# Patient Record
Sex: FEMALE | Race: WHITE | Employment: OTHER | URBAN - METROPOLITAN AREA
[De-identification: names, ages, dates, MRNs, and addresses within clinical notes are randomized per-mention and may not be internally consistent; named-entity substitution may affect disease eponyms.]

---

## 2017-05-29 ENCOUNTER — OFFICE VISIT (OUTPATIENT)
Dept: URGENT CARE | Facility: URGENT CARE | Age: 67
End: 2017-05-29
Payer: COMMERCIAL

## 2017-05-29 ENCOUNTER — RADIANT APPOINTMENT (OUTPATIENT)
Dept: GENERAL RADIOLOGY | Facility: CLINIC | Age: 67
End: 2017-05-29
Attending: PHYSICIAN ASSISTANT
Payer: COMMERCIAL

## 2017-05-29 VITALS
WEIGHT: 145.6 LBS | OXYGEN SATURATION: 96 % | TEMPERATURE: 97.9 F | HEART RATE: 70 BPM | DIASTOLIC BLOOD PRESSURE: 70 MMHG | BODY MASS INDEX: 24.42 KG/M2 | SYSTOLIC BLOOD PRESSURE: 108 MMHG

## 2017-05-29 DIAGNOSIS — S69.92XA: Primary | ICD-10-CM

## 2017-05-29 DIAGNOSIS — S69.92XA: ICD-10-CM

## 2017-05-29 PROCEDURE — 73110 X-RAY EXAM OF WRIST: CPT | Mod: LT

## 2017-05-29 PROCEDURE — 99213 OFFICE O/P EST LOW 20 MIN: CPT | Performed by: PHYSICIAN ASSISTANT

## 2017-05-29 NOTE — PROGRESS NOTES
SUBJECTIVE:  Chief Complaint   Patient presents with     Urgent Care     Fall     Pt states fell off porch yesterday and hurt left wrist. States has taken ibuprofen for the pain.      Krystal Douglass is a 67 year old female who presents with a chief complaint of left wrist swelling, tenderness and decreased range of motion.  Symptoms began 1 day(s) ago, are mild and sudden onset  Context:  Injury:Yes: yesterday.  Injury happened while at home. How: taking down the flag from the porch and fell landing on left wrist and side.  No other injuries delayed pain, delayed swelling, was able to bear weight directly after injury, no deformity was noted by the patient.   Pain exacerbated by twisting and flexion/extension Relieved by rest.  She treated it initially with ice and Ibuprofen. This is the first time this type of injury has occurred to this patient.     Past Medical History:   Diagnosis Date     Disorder of bone and cartilage, unspecified      Osteopenia 11/16/2005     Other specified personal history presenting hazards to health(V15.89)     abn paps with nl colpo     Unspecified disorder of lipoid metabolism      Current Outpatient Prescriptions   Medication Sig Dispense Refill     fluticasone (FLONASE) 50 MCG/ACT nasal spray Spray 2 sprays into both nostrils daily 3 Package 3     OMEGA 3 1000 MG OR CAPS one daliy  0     PROGESTERONE (YAM ROOT) CREAM bioidentical hormone cream  0     Social History   Substance Use Topics     Smoking status: Former Smoker     Packs/day: 0.30     Years: 30.00     Types: Cigarettes     Quit date: 7/1/2004     Smokeless tobacco: Never Used      Comment: 1 year and 3 months     Alcohol use Yes      Comment: 0-2 per week       ROS:  Review of systems negative except as stated below    EXAM:   /70 (BP Location: Right arm, Patient Position: Chair, Cuff Size: Adult Regular)  Pulse 70  Temp 97.9  F (36.6  C) (Tympanic)  Wt 145 lb 9.6 oz (66 kg)  LMP 02/22/2004  SpO2 96%  BMI 24.42  kg/m2  M/S Exam:wrist left with no swelling, bruising or deformity noted.  FROM in flexion and extension.  Focal pain over ulnar styloid.  Mild pain with supination.  Hand non tender.  Tendon function intact.   GENERAL APPEARANCE: healthy, alert and no distress  EXTREMITIES: peripheral pulses normal  SKIN: no suspicious lesions or rashes  NEURO: Normal strength and tone, sensory exam grossly normal, mentation intact and speech normal    X-RAY was done.  Normal     assessment/plan:  (S69.92XA) Injury of wrist, left, initial encounter  (primary encounter diagnosis)  Comment: left   Plan: XR Wrist Left G/E 3 Views, order for DME        No fracture noted.  Patient right handed but would like a splint for support.  Ice and OTC med for pain.  FU with PCP as needed.  ROM encouraged.

## 2017-05-29 NOTE — MR AVS SNAPSHOT
After Visit Summary   5/29/2017    Krystal Douglass    MRN: [id number]           Patient Information     Date Of Birth          1950        Visit Information        Provider Department      5/29/2017 9:20 AM Caitlyn Ball PA-C [facility]        Today's Diagnoses     Injury of wrist, left, initial encounter    -  1       Follow-ups after your visit        Who to contact     If you have questions or need follow up information about today's clinic visit or your schedule please contact [facility] directly at [phone].  Normal or non-critical lab and imaging results will be communicated to you by ClassPasshart, letter or phone within 4 business days after the clinic has received the results. If you do not hear from us within 7 days, please contact the clinic through Atmosferiqt or phone. If you have a critical or abnormal lab result, we will notify you by phone as soon as possible.  Submit refill requests through CS-Keys or call your pharmacy and they will forward the refill request to us. Please allow 3 business days for your refill to be completed.          Additional Information About Your Visit        MyChart Information     CS-Keys gives you secure access to your electronic health record. If you see a primary care provider, you can also send messages to your care team and make appointments. If you have questions, please call your primary care clinic.  If you do not have a primary care provider, please call [phone] and they will assist you.        Care EveryWhere ID     This is your Care EveryWhere ID. This could be used by other organizations to access your [place] medical records  [id number]        Your Vitals Were     Pulse Temperature Last Period Pulse Oximetry BMI (Body Mass Index)       70 97.9  F (36.6  C) (Tympanic) 02/22/2004 96% 24.42 kg/m2        Blood Pressure from Last 3 Encounters:   05/29/17 108/70   09/15/14 100/62   03/18/13 102/58    Weight  from Last 3 Encounters:   05/29/17 145 lb 9.6 oz (66 kg)   09/15/14 138 lb 12.8 oz (63 kg)   03/18/13 143 lb 11.2 oz (65.2 kg)                 Today's Medication Changes          These changes are accurate as of: 5/29/17 12:05 PM.  If you have any questions, ask your nurse or doctor.               Start taking these medicines.        Dose/Directions    order for DME   Used for:  Injury of wrist, left, initial encounter   Started by:  Caitlyn Ball PA-C        Left wrist brace without thumb   Quantity:  1 Device   Refills:  0            Where to get your medicines      Some of these will need a paper prescription and others can be bought over the counter.  Ask your nurse if you have questions.     Bring a paper prescription for each of these medications     order for DME                Primary Care Provider Office Phone # Fax #    Sharona Espinoza [phone]       [facility] [address] [place] [id number]        Thank you!     Thank you for choosing [facility]  for your care. Our goal is always to provide you with excellent care. Hearing back from our patients is one way we can continue to improve our services. Please take a few minutes to complete the written survey that you may receive in the mail after your visit with us. Thank you!             Your Updated Medication List - Protect others around you: Learn how to safely use, store and throw away your medicines at www.disposemymeds.org.          This list is accurate as of: 5/29/17 12:05 PM.  Always use your most recent med list.                   Brand Name Dispense Instructions for use    fluticasone 50 MCG/ACT spray    FLONASE    3 Package    Spray 2 sprays into both nostrils daily       omega 3 1000 MG Caps      one daliy       order for DME     1 Device    Left wrist brace without thumb       PROGESTERONE (YAM ROOT) CREAM      bioidentical hormone cream

## 2017-05-29 NOTE — NURSING NOTE
"Chief Complaint   Patient presents with     Urgent Care     Fall     Pt states fell off porch yesterday and hurt left wrist. States has taken ibuprofen for the pain.        Initial /70 (BP Location: Right arm, Patient Position: Chair, Cuff Size: Adult Regular)  Pulse 70  Temp 97.9  F (36.6  C) (Tympanic)  Wt 145 lb 9.6 oz (66 kg)  LMP 02/22/2004  SpO2 96%  BMI 24.42 kg/m2 Estimated body mass index is 24.42 kg/(m^2) as calculated from the following:    Height as of 9/15/14: 5' 4.75\" (1.645 m).    Weight as of this encounter: 145 lb 9.6 oz (66 kg).  Medication Reconciliation: unable or not appropriate to perform   Gabriela Ahumada CMA (AAMA) 5/29/2017 9:32 AM    "

## 2017-06-12 ENCOUNTER — TELEPHONE (OUTPATIENT)
Dept: PEDIATRICS | Facility: CLINIC | Age: 67
End: 2017-06-12

## 2017-06-12 ENCOUNTER — MYC MEDICAL ADVICE (OUTPATIENT)
Dept: URGENT CARE | Facility: URGENT CARE | Age: 67
End: 2017-06-12

## 2017-06-12 NOTE — TELEPHONE ENCOUNTER
Notified patient via mychart that pt does not need referral to ortho per Caitlyn Ball.   Gabriela Ahumada CMA (AAMA) 6/12/2017 2:42 PM

## 2017-06-12 NOTE — TELEPHONE ENCOUNTER
Patient was seen over the weekend of 5/29  and her wrist is not much better at all. Wondering if she could get a referral to Ortho? [phone] ok to lm.   Jennifer Ferguson RN

## 2017-06-24 ENCOUNTER — HEALTH MAINTENANCE LETTER (OUTPATIENT)
Age: 67
End: 2017-06-24

## 2018-01-24 ENCOUNTER — OFFICE VISIT (OUTPATIENT)
Dept: URGENT CARE | Facility: URGENT CARE | Age: 68
End: 2018-01-24
Payer: COMMERCIAL

## 2018-01-24 ENCOUNTER — NURSE TRIAGE (OUTPATIENT)
Dept: NURSING | Facility: CLINIC | Age: 68
End: 2018-01-24

## 2018-01-24 VITALS
BODY MASS INDEX: 24.42 KG/M2 | HEART RATE: 71 BPM | SYSTOLIC BLOOD PRESSURE: 112 MMHG | OXYGEN SATURATION: 96 % | TEMPERATURE: 98.3 F | WEIGHT: 145.6 LBS | RESPIRATION RATE: 20 BRPM | DIASTOLIC BLOOD PRESSURE: 62 MMHG

## 2018-01-24 DIAGNOSIS — R05.9 COUGH: Primary | ICD-10-CM

## 2018-01-24 PROCEDURE — 99213 OFFICE O/P EST LOW 20 MIN: CPT | Performed by: FAMILY MEDICINE

## 2018-01-24 RX ORDER — ALBUTEROL SULFATE 90 UG/1
2 AEROSOL, METERED RESPIRATORY (INHALATION) EVERY 6 HOURS PRN
Qty: 1 INHALER | Refills: 0 | Status: SHIPPED | OUTPATIENT
Start: 2018-01-24 | End: 2021-06-11

## 2018-01-24 RX ORDER — AZITHROMYCIN 250 MG/1
TABLET, FILM COATED ORAL
Qty: 6 TABLET | Refills: 0 | Status: SHIPPED | OUTPATIENT
Start: 2018-01-24 | End: 2021-06-11

## 2018-01-24 RX ORDER — BENZONATATE 100 MG/1
200 CAPSULE ORAL 3 TIMES DAILY PRN
Qty: 42 CAPSULE | Refills: 3 | Status: SHIPPED | OUTPATIENT
Start: 2018-01-24 | End: 2021-06-11

## 2018-01-24 RX ORDER — CODEINE PHOSPHATE AND GUAIFENESIN 10; 100 MG/5ML; MG/5ML
1-2 SOLUTION ORAL EVERY 6 HOURS PRN
Qty: 236 ML | Refills: 0 | Status: SHIPPED | OUTPATIENT
Start: 2018-01-24 | End: 2021-06-11

## 2018-01-24 NOTE — TELEPHONE ENCOUNTER
Reason for Disposition    Wheezing is present    Protocols used: COUGH - ACUTE PRODUCTIVE-ADULT-AH    She was laying down in bed last night when she heard some wheezing or squeaking in her breathing.  I connected with scheduling to see if they can get her an appointment otherwise I advised urgent care and I gave her the hours of the urgent care. She hung up before I could connect with scheduling.  Michelle Dickey RN-[facility]

## 2018-01-24 NOTE — MR AVS SNAPSHOT
After Visit Summary   1/24/2018    Krystal Douglass    MRN: [id number]           Patient Information     Date Of Birth          1950        Visit Information        Provider Department      1/24/2018 2:25 PM Pablo Mackay MD [facility]        Today's Diagnoses     Cough    -  1      Care Instructions    Humidifier, Steam    Drink plenty of water    Ibuprofen, Tylenol for fevers or for pain    follow up with the primary care provider if not better in 10 days.               Follow-ups after your visit        Who to contact     If you have questions or need follow up information about today's clinic visit or your schedule please contact [facility] directly at [phone].  Normal or non-critical lab and imaging results will be communicated to you by MyChart, letter or phone within 4 business days after the clinic has received the results. If you do not hear from us within 7 days, please contact the clinic through Metooohart or phone. If you have a critical or abnormal lab result, we will notify you by phone as soon as possible.  Submit refill requests through Fashiontrot or call your pharmacy and they will forward the refill request to us. Please allow 3 business days for your refill to be completed.          Additional Information About Your Visit        MyChart Information     Fashiontrot gives you secure access to your electronic health record. If you see a primary care provider, you can also send messages to your care team and make appointments. If you have questions, please call your primary care clinic.  If you do not have a primary care provider, please call [phone] and they will assist you.        Care EveryWhere ID     This is your Care EveryWhere ID. This could be used by other organizations to access your [place] medical records  [id number]        Your Vitals Were     Pulse Temperature Respirations Last Period Pulse Oximetry BMI (Body Mass Index)    71 98.3  F  (36.8  C) (Oral) 20 02/22/2004 96% 24.42 kg/m2       Blood Pressure from Last 3 Encounters:   01/24/18 112/62   05/29/17 108/70   09/15/14 100/62    Weight from Last 3 Encounters:   01/24/18 145 lb 9.6 oz (66 kg)   05/29/17 145 lb 9.6 oz (66 kg)   09/15/14 138 lb 12.8 oz (63 kg)              Today, you had the following     No orders found for display         Today's Medication Changes          These changes are accurate as of 1/24/18  3:45 PM.  If you have any questions, ask your nurse or doctor.               Start taking these medicines.        Dose/Directions    albuterol 108 (90 BASE) MCG/ACT Inhaler   Commonly known as:  PROAIR HFA/PROVENTIL HFA/VENTOLIN HFA   Used for:  Cough   Started by:  Pablo Mackay MD        Dose:  2 puff   Inhale 2 puffs into the lungs every 6 hours as needed for shortness of breath / dyspnea or wheezing /chest tightness/cough   Quantity:  1 Inhaler   Refills:  0       azithromycin 250 MG tablet   Commonly known as:  ZITHROMAX   Used for:  Cough   Started by:  Pablo Mackay MD        Two tablets first day, then one tablet daily for four days.   Quantity:  6 tablet   Refills:  0       benzonatate 100 MG capsule   Commonly known as:  TESSALON   Used for:  Cough   Started by:  Pablo Mackay MD        Dose:  200 mg   Take 2 capsules (200 mg) by mouth 3 times daily as needed   Quantity:  42 capsule   Refills:  3       guaiFENesin-codeine 100-10 MG/5ML Soln solution   Commonly known as:  ROBITUSSIN AC   Used for:  Cough   Started by:  Pablo Mackay MD        Dose:  1-2 tsp.   Take 5-10 mLs by mouth every 6 hours as needed   Quantity:  236 mL   Refills:  0            Where to get your medicines      These medications were sent to [facility] - [address]   [address] [place] [address] [address]     Phone:  [phone]     albuterol 108 (90 BASE) MCG/ACT Inhaler    azithromycin 250 MG tablet    benzonatate 100 MG capsule         Some of  these will need a paper prescription and others can be bought over the counter.  Ask your nurse if you have questions.     Bring a paper prescription for each of these medications     guaiFENesin-codeine 100-10 MG/5ML Soln solution                Primary Care Provider Fax #    Physician No Ref-Primary [phone]       No address on file        Equal Access to Services     THOMAS DAWSON : Hadii aad ku hadasho Soomaali, waaxda luqadaha, qaybta kaalmada adevioletayada, beka lornain hayaan genarovioleta masters navi . So [facility] [phone].    ATENCIÓN: Si habla español, tiene a aguilar disposición servicios gratuitos de asistencia lingüística. Llame al [phone].    We comply with applicable federal civil rights laws and [place] laws. We do not discriminate on the basis of race, color, national origin, age, disability, sex, sexual orientation, or gender identity.            Thank you!     Thank you for choosing [facility]  for your care. Our goal is always to provide you with excellent care. Hearing back from our patients is one way we can continue to improve our services. Please take a few minutes to complete the written survey that you may receive in the mail after your visit with us. Thank you!             Your Updated Medication List - Protect others around you: Learn how to safely use, store and throw away your medicines at www.disposemymeds.org.          This list is accurate as of 1/24/18  3:45 PM.  Always use your most recent med list.                   Brand Name Dispense Instructions for use Diagnosis    albuterol 108 (90 BASE) MCG/ACT Inhaler    PROAIR HFA/PROVENTIL HFA/VENTOLIN HFA    1 Inhaler    Inhale 2 puffs into the lungs every 6 hours as needed for shortness of breath / dyspnea or wheezing /chest tightness/cough    Cough       azithromycin 250 MG tablet    ZITHROMAX    6 tablet    Two tablets first day, then one tablet daily for four days.    Cough       benzonatate 100 MG capsule    TESSALON     42 capsule    Take 2 capsules (200 mg) by mouth 3 times daily as needed    Cough       fluticasone 50 MCG/ACT spray    FLONASE    3 Package    Spray 2 sprays into both nostrils daily    Allergic rhinitis, cause unspecified       guaiFENesin-codeine 100-10 MG/5ML Soln solution    ROBITUSSIN AC    236 mL    Take 5-10 mLs by mouth every 6 hours as needed    Cough       omega 3 1000 MG Caps      one daliy        order for DME     1 Device    Left wrist brace without thumb    Injury of wrist, left, initial encounter       PROGESTERONE (YAM ROOT) CREAM      bioidentical hormone cream

## 2018-01-24 NOTE — PATIENT INSTRUCTIONS
Humidifier, Steam    Drink plenty of water    Ibuprofen, Tylenol for fevers or for pain    follow up with the primary care provider if not better in 10 days.

## 2018-01-24 NOTE — PROGRESS NOTES
SUBJECTIVE:   Krystal Douglass is a 68 year old female presenting with a chief complaint of wet-sounding cough (throughout the day, moderate coughing attacks, with a sensation of congestion in the throat) , heaviness in the chest, fatigue, wheezing/squeaking when breathing yesterday.  Patient had chills from last week until two days ago and a stuffy nose from last week until a few days ago.. No shortness of breath.    Appetite has been decreased.  Sleep has been OK..  Onset of symptoms was one week ago.  Course of illness is still persisting.    Severity moderate cough  Current and Associated symptoms: as listed above.   Treatment measures tried include Tylenol Extra Strength  .  Predisposing factors include her daughter had similar symptoms.  .    Past Medical History:   Diagnosis Date     Disorder of bone and cartilage, unspecified      Osteopenia 11/16/2005     Other specified personal history presenting hazards to health(V15.89)     abn paps with nl colpo     Unspecified disorder of lipoid metabolism      Current Outpatient Prescriptions   Medication Sig Dispense Refill     fluticasone (FLONASE) 50 MCG/ACT nasal spray Spray 2 sprays into both nostrils daily 3 Package 3     OMEGA 3 1000 MG OR CAPS one daliy  0     PROGESTERONE (YAM ROOT) CREAM bioidentical hormone cream  0     order for DME Left wrist brace without thumb (Patient not taking: Reported on 1/24/2018) 1 Device 0     Social History   Substance Use Topics     Smoking status: Former Smoker     Packs/day: 0.30     Years: 30.00     Types: Cigarettes     Quit date: 7/1/2004     Smokeless tobacco: Never Used      Comment: 1 year and 3 months     Alcohol use Yes      Comment: 0-2 per week       ROS:  Review of systems negative except as stated above.    OBJECTIVE:  /62 (BP Location: Right arm, Patient Position: Chair, Cuff Size: Adult Regular)  Pulse 71  Temp 98.3  F (36.8  C) (Oral)  Resp 20  Wt 145 lb 9.6 oz (66 kg)  LMP 02/22/2004  SpO2 96%  BMI  24.42 kg/m2  GENERAL APPEARANCE: healthy, alert and no distress.  No acute respiratory distress.    HENT: TM's normal bilaterally, nasal turbinates erythematous, swollen and Oropharynx is mildly erythematous without exudates.   NECK: supple, nontender, no lymphadenopathy  RESP: lungs clear to auscultation - no rales, rhonchi or wheezes  CV: regular rates and rhythm, normal S1 S2, no murmur noted  SKIN: no suspicious lesions or rashes    ASSESSMENT:  Cough    PLAN:  Rx:  Azithromycin, Albuterol MDI, Placido QUIROZ, Tessalon Perles  See orders in Epic  follow up with the primary care provider if not better in 10 days  Humidifier, Steam  Drink plenty of water.      Pablo Mackay MD

## 2018-06-30 ENCOUNTER — HEALTH MAINTENANCE LETTER (OUTPATIENT)
Age: 68
End: 2018-06-30

## 2019-12-15 ENCOUNTER — HEALTH MAINTENANCE LETTER (OUTPATIENT)
Age: 69
End: 2019-12-15

## 2021-01-15 ENCOUNTER — HEALTH MAINTENANCE LETTER (OUTPATIENT)
Age: 71
End: 2021-01-15

## 2021-06-09 ENCOUNTER — MYC MEDICAL ADVICE (OUTPATIENT)
Dept: PEDIATRICS | Facility: CLINIC | Age: 71
End: 2021-06-09

## 2021-06-09 NOTE — TELEPHONE ENCOUNTER
MA/TC: Please see MC message from pt. Please let pt know if this is possible    Thank you  Don Smith RN on 6/9/2021 at 5:44 PM

## 2021-06-10 NOTE — TELEPHONE ENCOUNTER
Left message for patient requesting call back to discuss scheduling options.  Provided direct number.  Upon return call, please transfer patient to [facility] () [phone].    [facility] Simon

## 2021-06-11 ENCOUNTER — OFFICE VISIT (OUTPATIENT)
Dept: PEDIATRICS | Facility: CLINIC | Age: 71
End: 2021-06-11
Payer: COMMERCIAL

## 2021-06-11 ENCOUNTER — ANCILLARY PROCEDURE (OUTPATIENT)
Dept: GENERAL RADIOLOGY | Facility: CLINIC | Age: 71
End: 2021-06-11
Attending: INTERNAL MEDICINE
Payer: COMMERCIAL

## 2021-06-11 VITALS
SYSTOLIC BLOOD PRESSURE: 112 MMHG | RESPIRATION RATE: 14 BRPM | OXYGEN SATURATION: 95 % | BODY MASS INDEX: 25.34 KG/M2 | WEIGHT: 151.1 LBS | HEART RATE: 68 BPM | DIASTOLIC BLOOD PRESSURE: 62 MMHG | TEMPERATURE: 99 F

## 2021-06-11 DIAGNOSIS — M25.552 HIP PAIN, LEFT: ICD-10-CM

## 2021-06-11 DIAGNOSIS — M25.552 HIP PAIN, LEFT: Primary | ICD-10-CM

## 2021-06-11 PROCEDURE — 99203 OFFICE O/P NEW LOW 30 MIN: CPT | Performed by: INTERNAL MEDICINE

## 2021-06-11 PROCEDURE — 73502 X-RAY EXAM HIP UNI 2-3 VIEWS: CPT | Mod: LT | Performed by: RADIOLOGY

## 2021-06-11 RX ORDER — MULTIPLE VITAMINS W/ MINERALS TAB 9MG-400MCG
1 TAB ORAL DAILY
COMMUNITY
Start: 2021-06-11

## 2021-06-11 NOTE — PROGRESS NOTES
Assessment & Plan       ICD-10-CM    1. Hip pain, left  M25.552 XR Hip Left 2-3 Views     [facility]     Likely hip flexor strain / tendinitis as the cause for her symptoms. May have contribution from SI joint or lumbar facet arthropathy.   Discussed options  Start w/ PT  If no improvement over the next few weeks, plan orthopedic referral either [facility] or to orthopedic surgery      Return in about 4 weeks (around 7/9/2021), or if symptoms worsen or fail to improve.    Hussein Salvador MD  [facility] DEEJAY Rice is a 71 year old who presents for the following health issues     History of Present Illness       She eats 2-3 servings of fruits and vegetables daily.She consumes 0 sweetened beverage(s) daily.She exercises with enough effort to increase her heart rate 60 or more minutes per day.  She exercises with enough effort to increase her heart rate 5 days per week.        New Patient/Transfer of Care  Musculoskeletal problem/pain  Onset/Duration: x 3 months  Description  Location: hip - left  Joint Swelling: no  Redness: no  Pain: YES-   Warmth: no  Intensity:  5/10  Progression of Symptoms:  same  Accompanying signs and symptoms:   Fevers: no  Numbness/tingling/weakness: no  History  Trauma to the area: no  Recent illness:  no  Previous similar problem: no  Previous evaluation:  no  Precipitating or alleviating factors:  Aggravating factors include: walking  Therapies tried and outcome: heat- helps very little, ibuprofen helps.     Pain is noted over the proximal anterior left thigh.  No specific injury recalled.   Sx began 2-3 months ago.  No swelling.  Pain is worse w/ stairs, sometimes w/ walking.   No lower back pain, pelvic pain, knee pain.  No prior injury to the area noted.        Objective    /62 (BP Location: Right arm, Patient Position: Sitting, Cuff Size: Adult Regular)   Pulse 68   Temp 99  F (37.2  C) (Tympanic)   Resp 14   Wt 68.5 kg (151 lb 1.6  oz)   LMP 02/22/2004   SpO2 95%   BMI 25.34 kg/m    Body mass index is 25.34 kg/m .  Physical Exam   GEN: No distress  SKIN: No rashes  MS: Left hip w/ FROM. Does have pain elicited w/ left hip external rotation.   No lower back or piriformis tenderness w/ palpation.  No pain over the greater trochanter.   Normal knee evaluation    Recent Results (from the past 744 hour(s))   XR Hip Left 2-3 Views    Narrative    EXAM: XR HIP LEFT 2-3 VIEWS  DATE/TIME: 6/11/2021 4:27 PM     INDICATION: Left-sided hip pain.   COMPARISON: None.      Impression    IMPRESSION:  1.  Normal left hip joint spacing and alignment.  2.  Mild left sacroiliac degenerative arthrosis.  3.  Lower left lumbar facet arthrosis.    JESSICA MOORE MD

## 2021-06-16 ENCOUNTER — THERAPY VISIT (OUTPATIENT)
Dept: PHYSICAL THERAPY | Facility: CLINIC | Age: 71
End: 2021-06-16
Attending: INTERNAL MEDICINE
Payer: COMMERCIAL

## 2021-06-16 DIAGNOSIS — M25.552 HIP PAIN, LEFT: ICD-10-CM

## 2021-06-16 PROCEDURE — 97110 THERAPEUTIC EXERCISES: CPT | Mod: GP | Performed by: PHYSICAL THERAPIST

## 2021-06-16 PROCEDURE — 97161 PT EVAL LOW COMPLEX 20 MIN: CPT | Mod: GP | Performed by: PHYSICAL THERAPIST

## 2021-06-16 ASSESSMENT — ACTIVITIES OF DAILY LIVING (ADL)
HOS_ADL_COUNT: 17
HOS_ADL_SCORE(%): 73.53
HEAVY_WORK: SLIGHT DIFFICULTY
WALKING_UP_STEEP_HILLS: EXTREME DIFFICULTY
GOING_UP_1_FLIGHT_OF_STAIRS: EXTREME DIFFICULTY
GETTING_INTO_AND_OUT_OF_A_BATHTUB: NO DIFFICULTY AT ALL
RECREATIONAL_ACTIVITIES: MODERATE DIFFICULTY
WALKING_15_MINUTES_OR_GREATER: SLIGHT DIFFICULTY
LIGHT_TO_MODERATE_WORK: NO DIFFICULTY AT ALL
STEPPING_UP_AND_DOWN_CURBS: NO DIFFICULTY AT ALL
HOS_ADL_ITEM_SCORE_TOTAL: 50
STANDING_FOR_15_MINUTES: NO DIFFICULTY AT ALL
WALKING_APPROXIMATELY_10_MINUTES: NO DIFFICULTY AT ALL
HOS_ADL_HIGHEST_POTENTIAL_SCORE: 68
ROLLING_OVER_IN_BED: SLIGHT DIFFICULTY
GETTING_INTO_AND_OUT_OF_AN_AVERAGE_CAR: MODERATE DIFFICULTY
TWISTING/PIVOTING_ON_INVOLVED_LEG: MODERATE DIFFICULTY
GOING_DOWN_1_FLIGHT_OF_STAIRS: SLIGHT DIFFICULTY
PUTTING_ON_SOCKS_AND_SHOES: NO DIFFICULTY AT ALL
WALKING_INITIALLY: NO DIFFICULTY AT ALL
HOW_WOULD_YOU_RATE_YOUR_CURRENT_LEVEL_OF_FUNCTION_DURING_YOUR_USUAL_ACTIVITIES_OF_DAILY_LIVING_FROM_0_TO_100_WITH_100_BEING_YOUR_LEVEL_OF_FUNCTION_PRIOR_TO_YOUR_HIP_PROBLEM_AND_0_BEING_THE_INABILITY_TO_PERFORM_ANY_OF_YOUR_USUAL_DAILY_ACTIVITIES?: 80
SITTING_FOR_15_MINUTES: NO DIFFICULTY AT ALL
DEEP_SQUATTING: SLIGHT DIFFICULTY
WALKING_DOWN_STEEP_HILLS: SLIGHT DIFFICULTY

## 2021-06-16 NOTE — PROGRESS NOTES
Physical Therapy Initial Evaluation  Subjective:  The history is provided by the patient. No  was used.   Patient Health History  Krystal Douglass being seen for L hip.     Problem began: 4/26/2021.   Problem occurred: Dont know   Pain is reported as 8/10 on pain scale.  General health as reported by patient is good.  Pertinent medical history includes: none.   Red flags:  Pain at rest/night.  Medical allergies: none.   Surgeries include:  None.    Current medications:  Anti-inflammatory.    Current occupation is retired.   Primary job tasks include:  Computer work, driving and lifting/carrying.                  Therapist Generated HPI Evaluation  Problem details: Reports insidious onset lateral L hip pain beginning around April 2021. Walks for exercise, typically 1 hour, 5 days per week. Around this time increased her walks by 1/2 mile. Pain occasionally radiates into the L buttock/low back. Pain increases at night when rolling in bed, walking up hills, going up stairs and getting into/out of a car. Ibuprofen and rest helps with the pain. Hasn't been walking or doing her typical strength routine due to the pain..         Type of problem:  Left hip.    This is a new condition.  Condition occurred with:  Insidious onset.  Where condition occurred: for unknown reasons.  Patient reports pain:  Lateral, anterior and greater trochanter.  Pain is described as aching and is intermittent.  Pain radiates to:  Gluteals. Pain is worse during the night.  Since onset symptoms are unchanged.  Associated with: none. Symptoms are exacerbated by lying on extremity, other, ascending stairs and transfers (rolling in bed, walking up hill, getting into/out of a car)  and relieved by NSAID's.      Barriers include:  None as reported by patient.                        Objective:    Gait:    Gait Type:  Normal         Flexibility/Screens:       Lower Extremity:  Decreased left lower extremity flexibility:IT  Band                                                   Hip Evaluation  Hip PROM:  Hip PROM:  Left Hip:   Normal (pain with end range flexion and IR)  Right Hip:                           Hip Strength:    Flexion:   Left: 4+/5   +  Pain:  Right: 5/5   Pain:                    Extension:  Left: 4+/5  Pain:Right: 4+/5    Pain:    Abduction:  Left: 4/5     Pain:Right: 5-/5    Pain:        Knee Flexion:  Left: 5/5   Pain:Right: 5/5   Pain:  Knee Extension:  Left: 5/5   Pain:Right: 5/5    Pain:        Hip Special Testing:    Left hip positive for the following special tests:  Laurie; Fadir/Labrum and Natan's  Left hip negative for the following special tests:  SLR      Hip Palpation:    Left hip tenderness present at:   IT Band  Left hip tenderness not present at:  Greater Trachanter; Piriformis; Gluteus Medius or Bursa    Functional Testing:          Quad:      Bilateral leg squat:  Increased lumbar flexion  Mild loss of control                  General     ROS    Assessment/Plan:    Patient is a 71 year old female with left side hip complaints.    Patient has the following significant findings with corresponding treatment plan.                Diagnosis 1:  L hip pain  Pain -  hot/cold therapy, manual therapy, education, directional preference exercise and home program  Decreased strength - therapeutic exercise and therapeutic activities  Impaired balance - neuro re-education and therapeutic activities  Decreased proprioception - neuro re-education and therapeutic activities  Impaired gait - gait training  Impaired muscle performance - neuro re-education  Decreased function - therapeutic activities    Therapy Evaluation Codes:   1) History comprised of:   Personal factors that impact the plan of care:      None.    Comorbidity factors that impact the plan of care are:      None.     Medications impacting care: Anti-inflammatory.  2) Examination of Body Systems comprised of:   Body structures and functions that impact the  plan of care:      Hip.   Activity limitations that impact the plan of care are:      Driving, Stairs, Walking, Sleeping and Laying down.  3) Clinical presentation characteristics are:   Stable/Uncomplicated.  4) Decision-Making    Low complexity using standardized patient assessment instrument and/or measureable assessment of functional outcome.  Cumulative Therapy Evaluation is: Low complexity.    Previous and current functional limitations:  (See Goal Flow Sheet for this information)    Short term and Long term goals: (See Goal Flow Sheet for this information)     Communication ability:  Patient appears to be able to clearly communicate and understand verbal and written communication and follow directions correctly.  Treatment Explanation - The following has been discussed with the patient:   RX ordered/plan of care  Anticipated outcomes  Possible risks and side effects  This patient would benefit from PT intervention to resume normal activities.   Rehab potential is good.    Frequency:  1 X week, once daily  Duration:  for 8 weeks  Discharge Plan:  Achieve all LTG.  Independent in home treatment program.  Reach maximal therapeutic benefit.    Please refer to the daily flowsheet for treatment today, total treatment time and time spent performing 1:1 timed codes.

## 2021-06-25 ENCOUNTER — THERAPY VISIT (OUTPATIENT)
Dept: PHYSICAL THERAPY | Facility: CLINIC | Age: 71
End: 2021-06-25
Payer: COMMERCIAL

## 2021-06-25 DIAGNOSIS — M25.552 HIP PAIN, LEFT: ICD-10-CM

## 2021-06-25 PROCEDURE — 97140 MANUAL THERAPY 1/> REGIONS: CPT | Mod: GP | Performed by: PHYSICAL THERAPIST

## 2021-06-25 PROCEDURE — 97110 THERAPEUTIC EXERCISES: CPT | Mod: GP | Performed by: PHYSICAL THERAPIST

## 2021-06-30 ENCOUNTER — THERAPY VISIT (OUTPATIENT)
Dept: PHYSICAL THERAPY | Facility: CLINIC | Age: 71
End: 2021-06-30
Payer: COMMERCIAL

## 2021-06-30 DIAGNOSIS — M25.552 HIP PAIN, LEFT: ICD-10-CM

## 2021-06-30 PROCEDURE — 97530 THERAPEUTIC ACTIVITIES: CPT | Mod: GP | Performed by: PHYSICAL THERAPIST

## 2021-06-30 PROCEDURE — 97110 THERAPEUTIC EXERCISES: CPT | Mod: GP | Performed by: PHYSICAL THERAPIST

## 2021-06-30 PROCEDURE — 97140 MANUAL THERAPY 1/> REGIONS: CPT | Mod: GP | Performed by: PHYSICAL THERAPIST

## 2021-09-04 ENCOUNTER — HEALTH MAINTENANCE LETTER (OUTPATIENT)
Age: 71
End: 2021-09-04

## 2021-09-20 PROBLEM — M25.552 HIP PAIN, LEFT: Status: RESOLVED | Noted: 2021-06-16 | Resolved: 2021-09-20

## 2021-09-20 NOTE — PROGRESS NOTES
Discharge Note    Progress reporting period is from initial evaluation date (please see noted date below) to Jun 30, 2021.  Linked Episodes   Type: Episode: Status: Noted: Resolved: Last update: Updated by:   PHYSICAL THERAPY L hip 6/16/2021 Active 6/16/2021 6/30/2021  1:13 PM Jade Berry, PT      Comments:       Krystal failed to follow up and current status is unknown.  Please see information below for last relevant information on current status.  Patient seen for 3 visits.    SUBJECTIVE  Subjective changes noted by patient:  L hip is a little achey from the lateral to ant hip. Has been walking more, has increased to 30 minute walks. Stairs are improving. Hip abduction exercise continues to cause pain.  .  Current pain level is 5/10.     Previous pain level was  8/10.   Changes in function:  Yes (See Goal flowsheet attached for changes in current functional level)  Adverse reaction to treatment or activity: None    OBJECTIVE  Changes noted in objective findings: difficulty with glute med activation     ASSESSMENT/PLAN  Diagnosis: L hip pain   Updated problem list and treatment plan:   Pain - HEP  Decreased function - HEP  Decreased strength - HEP  Impaired muscle performance - HEP  STG/LTGs have been met or progress has been made towards goals:  Yes, please see goal flowsheet for most current information  Assessment of Progress: current status is unknown.    Last current status: Pt is progressing as expected   Self Management Plans:  HEP  I have re-evaluated this patient and find that the nature, scope, duration and intensity of the therapy is appropriate for the medical condition of the patient.  Krystal continues to require the following intervention to meet STG and LTG's:  HEP.    Recommendations:  Discharge with current home program.  Patient to follow up with MD as needed.    Please refer to the daily flowsheet for treatment today, total treatment time and time spent performing 1:1 timed codes.

## 2022-02-19 ENCOUNTER — HEALTH MAINTENANCE LETTER (OUTPATIENT)
Age: 72
End: 2022-02-19

## 2022-10-08 ENCOUNTER — OFFICE VISIT (OUTPATIENT)
Dept: URGENT CARE | Facility: URGENT CARE | Age: 72
End: 2022-10-08
Payer: COMMERCIAL

## 2022-10-08 VITALS
HEART RATE: 81 BPM | SYSTOLIC BLOOD PRESSURE: 139 MMHG | BODY MASS INDEX: 24.99 KG/M2 | TEMPERATURE: 97.6 F | WEIGHT: 149 LBS | OXYGEN SATURATION: 97 % | DIASTOLIC BLOOD PRESSURE: 82 MMHG

## 2022-10-08 DIAGNOSIS — H92.01 EAR DISCOMFORT, RIGHT: Primary | ICD-10-CM

## 2022-10-08 DIAGNOSIS — H61.21 IMPACTED CERUMEN OF RIGHT EAR: ICD-10-CM

## 2022-10-08 PROCEDURE — 69209 REMOVE IMPACTED EAR WAX UNI: CPT | Mod: RT | Performed by: PHYSICIAN ASSISTANT

## 2022-10-08 NOTE — PROGRESS NOTES
ASSESSMENT/PLAN:    Right ear cerumen impaction. Resolved with ear flush here today.     (H92.01) Ear discomfort, right  (primary encounter diagnosis)      (H61.21) Impacted cerumen of right ear  ---------    SUBJECTIVE:    Krystal Douglass presents to  today for evaluation of right ear discomfort x 1 week. Patient states she has had similar in past that resolved with ear flush in doctor office.     Associated symptoms: No ear drainage. No fever. No cough/cold or acute illness symptoms.      OBJECTIVE:  /82   Pulse 81   Temp 97.6  F (36.4  C) (Tympanic)   Wt 67.6 kg (149 lb)   LMP 02/22/2004   SpO2 97%   BMI 24.99 kg/m        General appearance: alert and no apparent distress  Skin color is pink and without rash.  HEENT:   Conjunctiva not injected.  Sclera clear.  Left external is clear. TM is normal: no effusions, no erythema, and normal landmarks.  Right external canal initially occluded with cerumen. After ear flush,  TM is normal: no effusions, no erythema, and normal landmarks.

## 2022-10-22 ENCOUNTER — HEALTH MAINTENANCE LETTER (OUTPATIENT)
Age: 72
End: 2022-10-22

## 2023-04-01 ENCOUNTER — HEALTH MAINTENANCE LETTER (OUTPATIENT)
Age: 73
End: 2023-04-01

## 2024-06-02 ENCOUNTER — HEALTH MAINTENANCE LETTER (OUTPATIENT)
Age: 74
End: 2024-06-02

## 2024-10-14 ENCOUNTER — TRANSFERRED RECORDS (OUTPATIENT)
Dept: HEALTH INFORMATION MANAGEMENT | Facility: CLINIC | Age: 74
End: 2024-10-14
Payer: COMMERCIAL

## 2025-06-14 ENCOUNTER — HEALTH MAINTENANCE LETTER (OUTPATIENT)
Age: 75
End: 2025-06-14